# Patient Record
Sex: MALE | Race: WHITE | NOT HISPANIC OR LATINO | URBAN - METROPOLITAN AREA
[De-identification: names, ages, dates, MRNs, and addresses within clinical notes are randomized per-mention and may not be internally consistent; named-entity substitution may affect disease eponyms.]

---

## 2024-08-21 ENCOUNTER — EMERGENCY (EMERGENCY)
Facility: HOSPITAL | Age: 64
LOS: 1 days | Discharge: HOME | End: 2024-08-21
Admitting: EMERGENCY MEDICINE
Payer: OTHER MISCELLANEOUS

## 2024-08-21 VITALS
HEART RATE: 99 BPM | SYSTOLIC BLOOD PRESSURE: 117 MMHG | TEMPERATURE: 98 F | DIASTOLIC BLOOD PRESSURE: 80 MMHG | OXYGEN SATURATION: 96 % | WEIGHT: 195.11 LBS | RESPIRATION RATE: 18 BRPM

## 2024-08-21 VITALS
DIASTOLIC BLOOD PRESSURE: 81 MMHG | OXYGEN SATURATION: 97 % | TEMPERATURE: 98 F | HEART RATE: 95 BPM | RESPIRATION RATE: 18 BRPM | SYSTOLIC BLOOD PRESSURE: 122 MMHG

## 2024-08-21 PROCEDURE — 73110 X-RAY EXAM OF WRIST: CPT | Mod: 26,LT

## 2024-08-21 PROCEDURE — 99284 EMERGENCY DEPT VISIT MOD MDM: CPT

## 2024-08-21 PROCEDURE — 73110 X-RAY EXAM OF WRIST: CPT | Mod: 26,77,LT

## 2024-08-21 PROCEDURE — 73130 X-RAY EXAM OF HAND: CPT | Mod: 26,77,LT

## 2024-08-21 PROCEDURE — 73130 X-RAY EXAM OF HAND: CPT | Mod: 26,LT

## 2024-08-21 RX ORDER — ACETAMINOPHEN 500 MG
975 TABLET ORAL ONCE
Refills: 0 | Status: COMPLETED | OUTPATIENT
Start: 2024-08-21 | End: 2024-08-21

## 2024-08-21 RX ORDER — CLOSTRIDIUM TETANI TOXOID ANTIGEN (FORMALDEHYDE INACTIVATED), CORYNEBACTERIUM DIPHTHERIAE TOXOID ANTIGEN (FORMALDEHYDE INACTIVATED), BORDETELLA PERTUSSIS TOXOID ANTIGEN (GLUTARALDEHYDE INACTIVATED), BORDETELLA PERTUSSIS FILAMENTOUS HEMAGGLUTININ ANTIGEN (FORMALDEHYDE INACTIVATED), BORDETELLA PERTUSSIS PERTACTIN ANTIGEN, AND BORDETELLA PERTUSSIS FIMBRIAE 2/3 ANTIGEN 5; 2; 2.5; 5; 3; 5 [LF]/.5ML; [LF]/.5ML; UG/.5ML; UG/.5ML; UG/.5ML; UG/.5ML
0.5 INJECTION, SUSPENSION INTRAMUSCULAR ONCE
Refills: 0 | Status: COMPLETED | OUTPATIENT
Start: 2024-08-21 | End: 2024-08-21

## 2024-08-21 RX ORDER — LIDOCAINE HYDROCHLORIDE,EPINEPHRINE BITARTRATE 20; .005 MG/ML; MG/ML
20 INJECTION, SOLUTION EPIDURAL; INFILTRATION; INTRACAUDAL; PERINEURAL ONCE
Refills: 0 | Status: ACTIVE | OUTPATIENT
Start: 2024-08-21 | End: 2024-08-21

## 2024-08-21 RX ORDER — AMPICILLIN SOD/SULBACTAM SOD 3 G
3 VIAL (EA) INJECTION ONCE
Refills: 0 | Status: COMPLETED | OUTPATIENT
Start: 2024-08-21 | End: 2024-08-21

## 2024-08-21 RX ADMIN — CLOSTRIDIUM TETANI TOXOID ANTIGEN (FORMALDEHYDE INACTIVATED), CORYNEBACTERIUM DIPHTHERIAE TOXOID ANTIGEN (FORMALDEHYDE INACTIVATED), BORDETELLA PERTUSSIS TOXOID ANTIGEN (GLUTARALDEHYDE INACTIVATED), BORDETELLA PERTUSSIS FILAMENTOUS HEMAGGLUTININ ANTIGEN (FORMALDEHYDE INACTIVATED), BORDETELLA PERTUSSIS PERTACTIN ANTIGEN, AND BORDETELLA PERTUSSIS FIMBRIAE 2/3 ANTIGEN 0.5 MILLILITER(S): 5; 2; 2.5; 5; 3; 5 INJECTION, SUSPENSION INTRAMUSCULAR at 12:16

## 2024-08-21 RX ADMIN — Medication 975 MILLIGRAM(S): at 12:15

## 2024-08-21 RX ADMIN — Medication 10 MILLILITER(S): at 15:13

## 2024-08-21 RX ADMIN — Medication 200 GRAM(S): at 14:57

## 2024-08-21 NOTE — ED PROVIDER NOTE - NSFOLLOWUPINSTRUCTIONS_ED_ALL_ED_FT
- You were seen in the ER today for laceration to the left hand leaving to extensor tendon injury.     - Your wound was extensively debrided by the orthopedic team but the tendon was not able to be repaired.    - A splint was placed on your left arm, DO NOT get this wet, DO NOT use this arm until you follow up with the orthopedic team.      - A prescription for Augmentin, an antibiotic, was sent to your pharmacy, please take this twice daily for the next 7 days.     - Rest, Ice, Elevate injured area    - Take Motrin 600 mg every 8 hours as needed for moderate pain-- take with food..    - Take Tylenol 650mg (Two 325 mg pills) every 4-6 hours as needed for pain.    - Continue all previously prescribed medications as directed unless otherwise instructed.     - Follow-up with Dr Borges this week, contact information will be provided below for you. A referral coordinator will call you in the next 2-3 business days to assist you in obtaining a follow up appointment.     -Return to Emergency room for any worsening or persistent pain, swelling, weakness, numbness, fever, color change to extremity, or any other concerning symptoms. - You were seen in the ER today for laceration to the left hand leading to extensor tendon injury.     - Your wound was extensively debrided by the orthopedic team but the tendon was not able to be repaired. The laceration was repaired.    - A splint was placed on your left arm, DO NOT get this wet, DO NOT use this arm until you follow up with the orthopedic team.      - A prescription for Augmentin, an antibiotic, was sent to your pharmacy, please take this twice daily for the next 7 days.     - Rest, Ice, Elevate injured area    - Take Motrin 600 mg every 8 hours as needed for moderate pain-- take with food..    - Take Tylenol 650mg (Two 325 mg pills) every 4-6 hours as needed for pain.    - Continue all previously prescribed medications as directed unless otherwise instructed.     - Follow-up with Dr Borges this week, contact information will be provided below for you. A referral coordinator will call you in the next 1-3 business days to assist you in obtaining a follow up appointment if needed.     -Return to Emergency room for any worsening or persistent pain, swelling, weakness, numbness, fever, color change to extremity, or any other concerning symptoms.

## 2024-08-21 NOTE — ED PROVIDER NOTE - OBJECTIVE STATEMENT
Patient is 62yo M PMHx DM2 on metformin and geovany presents with c/o left hand laceration, cut on  while at work today around 11:45am. Patient unsure of last tetanus. Patient reports pain is 7/10, denies taking anything for the pain today, endorses decreased ROM to finger.

## 2024-08-21 NOTE — ED PROVIDER NOTE - PROGRESS NOTE DETAILS
Jose Urban, EM NP Fellow: Per phone call with surgery NOLBERTO Michael team to come to ER and assess patient after xrays are performed. Jose Urban, EM NP Fellow: ortho team remains at bedside in attempt to repair tendon laceration. Jose Urban, EM NP Fellow: ortho team at bedside to assess patient and debride wound. Jose Urban, EM NP Fellow: extensive debridement performed by ortho team, unable to repair extensor tendon at the bedside by ortho and team. Wound sutured closed, Thumb spica splint placed by ortho team. Per othro patient to follow up this week for reassessment and surgical fixation of extensor tendon. Per ortho recs patient to remain NWB LUE, keep dressing clean, dry, and intact (do not get wet). Rx for prophylactic Augmentin sent to patient's preferred pharmacy. Per patient report he is not sure if he will follow up with Dr Hudson or hand specialist closer to home. Patient educated on strict return precautions. Patient provided time to ask questions, all which were answered at the beside. NOLBERTO Leon: Patient also seen by myself, agree with documented history and physical.  Patient is a 63-year-old male with past medical history diabetes who presented to the ER with a left thumb laceration sustained by a stone .  On exam patient has laceration to the left thumb near the MCP with exposed tendon.  Patient is unable to extend thumb.  Concern for laceration with extensor tendon injury.  Patient had x-ray performed which did not show fracture, did show significant debris.  Orthopedics was covering for hand and was consulted, they performed extensive debridement to remove foreign body and attempted to repair the tendon without success.  Laceration was closed and thumb spica splint was placed by orthopedics.  Patient to follow-up with either Dr. Matthew's within the next week or he has a hand surgeon he has previously seen.  Sling was provided for comfort.  Patient also prescribed Augmentin prophylactically given history of diabetes and significant debris in wound.  Patient will return to the ER with any worsening or concerning symptoms, fever/chills, weakness, numbness, pus drainage.  Patient verbalized understanding keep splint intact and dry until he has follow-up with an orthopedic or hand surgeon.

## 2024-08-21 NOTE — ED PROVIDER NOTE - CARE PROVIDER_API CALL
Mary Kate Borges  Surgery of the Hand  [address], [address]  [place] [address]  Phone: [phone]  Fax: [phone]  Established Patient  Follow Up Time:

## 2024-08-21 NOTE — ED PROVIDER NOTE - CLINICAL SUMMARY MEDICAL DECISION MAKING FREE TEXT BOX
IDANIA Scott NP Fellow: Patient is 64yo M PMHx DM2 on metformin and geovany presents with c/o left hand laceration, cut on  while at work today around 11:45am. Patient unsure of last tetanus. Patient reports pain is 7/10, denies taking anything for the pain today, endorses decreased ROM to finger. Patient well appearing, nontoxic, and in no apparent distress. Physical exam pertinent for 2cm x 0.5cm  laceration with exposed tendon at base of left thumb. absent extensor reflex. intact flexion reflex. sensation intact. Capillary refill < 2 seconds. Patient likely with laceration and extensor tendon involvement vs open fracture. Will order tetanus, tylenol, and xrays of left and r/o foreign body or fracture. Will consult hand, disposition pending hand recommendations. IDANIA Scott NP Fellow: Patient is 62yo M PMHx DM2 on metformin and geovany presents with c/o left hand laceration, cut on  while at work today around 11:45am. Patient unsure of last tetanus. Patient reports pain is 7/10, denies taking anything for the pain today, endorses decreased ROM to finger. Patient well appearing, nontoxic, and in no apparent distress. Physical exam pertinent for 2cm x 0.5cm  laceration with exposed tendon at dorsal aspect of base of left thumb. absent extensor reflex. intact flexion reflex. sensation intact. Capillary refill < 2 seconds. Patient likely with laceration and extensor tendon involvement vs open fracture. Will order tetanus, tylenol, and xrays of left and r/o foreign body or fracture. Will consult hand, disposition pending hand recommendations.

## 2024-08-21 NOTE — ED ADULT TRIAGE NOTE - CHIEF COMPLAINT QUOTE
Pt s/p injury to left hand while at work. pt was cut with , Bleeding controlled in triage, Unknown last tetanus shot

## 2024-08-21 NOTE — ED PROVIDER NOTE - PATIENT PORTAL LINK FT
You can access the FollowMyHealth Patient Portal offered by [facility] by registering at the following website: [email or web address]. By joining [employer]’s FollowMyHealth portal, you will also be able to view your health information using other applications (apps) compatible with our system.

## 2024-08-21 NOTE — ED ADULT NURSE NOTE - OBJECTIVE STATEMENT
Pt received to [facility] , awake and alert, A&OX4, ambulatory. C/o left thumb lacerations, pt unable to move thumb. pt noted to have metal debrie in the lacerations,  Respirations even and unlabored. Denies CP, SOB, N/V, HA, dizziness, palpitations, blurry vision. 20G IV placed to  right AC   . Bed in lowest position, call bell within reach. Safety maintained.

## 2024-08-21 NOTE — CONSULT NOTE ADULT - SUBJECTIVE AND OBJECTIVE BOX
Plastic/Hand Surgery Consult    Consulting Attending: Dr. Borges       HPI:  64yo M PMHx DM2 presenting to ED with left thumb laceration from while using stone  at work. Patient reports that he was using a  to cut through a black rusted pipe. He reports pain within the region of the laceration. He denies any other injuries. Patient does not remember time of last dose of tetanus.       PHYSICAL EXAM    LUE:  3-5 cm open laceration over dorsal aspect of thumb with metal debris throughout, +TTP over thumb, no TTP along remainder of extremity; compartments soft  Numbness and tingling along distributions of thumb, index, and middle finger  Unable to extend or abductor the left thumb   +Rad pulse, WWP      VITAL SIGNS / I&O's    T(C): 36.7 (08-21-24 @ 11:43), Max: 36.7 (08-21-24 @ 11:43)  T(F): 98 (08-21-24 @ 11:43), Max: 98 (08-21-24 @ 11:43)  HR: 99 (08-21-24 @ 11:43) (99 - 99)  BP: 117/80 (08-21-24 @ 11:43) (117/80 - 117/80)  RR: 18 (08-21-24 @ 11:43) (18 - 18)  SpO2: 96% (08-21-24 @ 11:43) (96% - 96%)        Assessment & Plan:  62 yo male presenting s/p left thumb laceration with inability to extend and abduct the thumb. XR wrist showed multiple radiopaque densities along the thumb metacarpal concerning for foreign bodies. At bedside, laceration was copiously irrigated.                ORTHOPEDIC SURGERY CONSULT    HPI:  62yo M L-hand dominent with PMHx DM2 presenting to ED with left hand laceration over dorsal MCP. Injury occurred at work while using stone  to cut through a black rusted pipe. He reports pain within the region of the laceration. Unable to extend or abduct finger. He denies any other injuries. Patient does not remember time of last dose of tetanus.     OBJECTIVE  VITAL SIGNS / I&O's  T(C): 36.7 (08-21-24 @ 11:43), Max: 36.7 (08-21-24 @ 11:43)  T(F): 98 (08-21-24 @ 11:43), Max: 98 (08-21-24 @ 11:43)  HR: 99 (08-21-24 @ 11:43) (99 - 99)  BP: 117/80 (08-21-24 @ 11:43) (117/80 - 117/80)  RR: 18 (08-21-24 @ 11:43) (18 - 18)  SpO2: 96% (08-21-24 @ 11:43) (96% - 96%)    PHYSICAL EXAM    LUE:  5 cm open laceration over dorsal aspect of thumb with metal debris throughout, +TTP over thumb, no TTP along remainder of extremity; compartments soft  Numbness and tingling along distributions of thumb, index, and middle finger  Unable to extend or abduct the left thumb   +Rad pulse, WWP    IMAGING  INTERPRETATION: INDICATION: cut with table saw at base of thumb, laceration, r/o fracture, foreign body    COMPARISON: None available    FINDING: Three views of the left hand and a single focused view of the left thumb demonstrates no fracture. Joint spaces are narrowed at the PIPs and DIPs. Moderate to severe thumb interphalangeal joint narrowing. There are multiple radiopaque densities overlying the dorsal aspect of the thumb metacarpal phalangeal joint.    IMPRESSION:  Multiple radiopaque bodies overlying the thumb metacarpal phalangeal joint. No discrete fracture.  Degenerative changes.    --- End of Report ---    PROCEDURE  Using aseptic technique, local field block around the lac was administered using a total 6 cc of 1% lidocaine with epinephrine. The wound was copiously irrigated with betadine and saline. The wound edges were well approximated with 3-0 Nylon sutures in a simple fashion. Once the wound was closed, a well-padded soft dressing was applied and a thumb spica splint. The patient tolerated the procedure well without evidence of complications. The patient was neurovascularly intact following the procedure.    ASSESSMENT & PLAN  63yMale w/ L MCP lac with extensor tendon injury s/p irrigation and closure.  -NWB LUE  -keep dressing clean, dry, and intact (do not get wet)  -discharge on PO abx  -Tetanus received in ED  -pain control  -ice/cold compress, elevation  -f/u outpt with Dr. Borges in 1 week, call office for dylan. Pt states he may have hand surgeon closure to home ([facility]) that he would prefer to f/u with.                  ORTHOPEDIC SURGERY CONSULT    HPI:  62yo M L-hand dominent with PMHx DM2 presenting to ED with left hand laceration over dorsal MCP. Injury occurred at work while using stone  to cut through a black rusted pipe. He reports pain within the region of the laceration. Unable to extend or abduct finger. He denies any other injuries. Patient does not remember time of last dose of tetanus.     OBJECTIVE  VITAL SIGNS / I&O's  T(C): 36.7 (08-21-24 @ 11:43), Max: 36.7 (08-21-24 @ 11:43)  T(F): 98 (08-21-24 @ 11:43), Max: 98 (08-21-24 @ 11:43)  HR: 99 (08-21-24 @ 11:43) (99 - 99)  BP: 117/80 (08-21-24 @ 11:43) (117/80 - 117/80)  RR: 18 (08-21-24 @ 11:43) (18 - 18)  SpO2: 96% (08-21-24 @ 11:43) (96% - 96%)    PHYSICAL EXAM    LUE:  5 cm open laceration over dorsal aspect of thumb with metal debris throughout, +TTP over thumb, no TTP along remainder of extremity; compartments soft  Numbness and tingling along distributions of thumb, index, and middle finger  Unable to extend or abduct the left thumb   +Rad pulse, WWP    IMAGING  INTERPRETATION: INDICATION: cut with table saw at base of thumb, laceration, r/o fracture, foreign body    COMPARISON: None available    FINDING: Three views of the left hand and a single focused view of the left thumb demonstrates no fracture. Joint spaces are narrowed at the PIPs and DIPs. Moderate to severe thumb interphalangeal joint narrowing. There are multiple radiopaque densities overlying the dorsal aspect of the thumb metacarpal phalangeal joint.    IMPRESSION:  Multiple radiopaque bodies overlying the thumb metacarpal phalangeal joint. No discrete fracture.  Degenerative changes.    --- End of Report ---    PROCEDURE  Using aseptic technique, local field block around the lac was administered using a total 6 cc of 1% lidocaine with epinephrine. The wound was copiously irrigated with betadine and saline. The wound edges were well approximated with 3-0 Nylon sutures in a simple fashion. Once the wound was closed, a well-padded soft dressing was applied and a thumb spica splint. The patient tolerated the procedure well without evidence of complications. The patient was neurovascularly intact following the procedure.    ASSESSMENT & PLAN  63yMale w/ L MCP lac with extensor tendon injury s/p irrigation and closure.  -NWB LUE  -keep dressing clean, dry, and intact (do not get wet)  -discharge on PO abx  -Tetanus received in ED  -pain control  -ice/cold compress, elevation  -f/u outpt with Dr. Borges this week, call office for dylan. Pt states he may have hand surgeon closure to home ([facility]) that he would prefer to f/u with.

## 2024-08-21 NOTE — ED PROVIDER NOTE - PHYSICAL EXAMINATION
CONSTITUTIONAL: Alert and Oriented x3, NAD, resting comfortably, well groomed  HEAD: Normocephalic, atraumatic.  NECK:  Airway patent. Neck Supple.  RESPIRATORY: breathing non-labored.   MSK: Moving all extremities.    SKIN: 2cm x 0.5cm laceration with exposed tendon at base of left thumb. absent extensor reflex. intact flexion reflex. sensation intact. Capillary refill < 2 seconds.   NEURO: alert and interactive, cooperative, pleasant, oriented x3, no focal deficits. No paresthesias. CONSTITUTIONAL: Alert and Oriented x3, NAD, resting comfortably, well groomed  HEAD: Normocephalic, atraumatic.  NECK:  Airway patent. Neck Supple.  RESPIRATORY: breathing non-labored.   MSK: Moving all extremities.    SKIN: 2cm x 0.5cm laceration with exposed tendon at dorsal aspect base of left thumb. absent extensor reflex. intact flexion reflex. sensation intact. Capillary refill < 2 seconds.   NEURO: alert and interactive, cooperative, pleasant, oriented x3, no focal deficits. No paresthesias.